# Patient Record
Sex: FEMALE | Race: WHITE | NOT HISPANIC OR LATINO | URBAN - METROPOLITAN AREA
[De-identification: names, ages, dates, MRNs, and addresses within clinical notes are randomized per-mention and may not be internally consistent; named-entity substitution may affect disease eponyms.]

---

## 2021-12-01 ENCOUNTER — OFFICE VISIT (OUTPATIENT)
Dept: URBAN - METROPOLITAN AREA CLINIC 12 | Facility: CLINIC | Age: 76
End: 2021-12-01

## 2021-12-30 ENCOUNTER — OFFICE VISIT (OUTPATIENT)
Dept: URBAN - METROPOLITAN AREA CLINIC 111 | Facility: CLINIC | Age: 76
End: 2021-12-30

## 2022-06-04 ENCOUNTER — CLAIMS CREATED FROM THE CLAIM WINDOW (OUTPATIENT)
Dept: URBAN - METROPOLITAN AREA MEDICAL CENTER 27 | Facility: MEDICAL CENTER | Age: 77
End: 2022-06-04

## 2022-06-04 ENCOUNTER — CLAIMS CREATED FROM THE CLAIM WINDOW (OUTPATIENT)
Dept: URBAN - METROPOLITAN AREA MEDICAL CENTER 27 | Facility: MEDICAL CENTER | Age: 77
End: 2022-06-04
Payer: MEDICARE

## 2022-06-04 DIAGNOSIS — D50.9 ANEMIA: ICD-10-CM

## 2022-06-04 DIAGNOSIS — K31.89 ACQUIRED DEFORMITY OF DUODENUM: ICD-10-CM

## 2022-06-04 DIAGNOSIS — K20.80 ESOPHAGITIS DISSECANS SUPERFICIALIS: ICD-10-CM

## 2022-06-04 DIAGNOSIS — Z79.02 ANTIPLATELET OR ANTITHROMBOTIC LONG-TERM USE: ICD-10-CM

## 2022-06-04 DIAGNOSIS — K92.1 ACUTE MELENA: ICD-10-CM

## 2022-06-04 DIAGNOSIS — D64.89 ANEMIA DUE TO OTHER CAUSE: ICD-10-CM

## 2022-06-04 PROCEDURE — 99223 1ST HOSP IP/OBS HIGH 75: CPT | Performed by: INTERNAL MEDICINE

## 2022-06-04 PROCEDURE — 43239 EGD BIOPSY SINGLE/MULTIPLE: CPT | Performed by: INTERNAL MEDICINE

## 2022-06-04 PROCEDURE — G8427 DOCREV CUR MEDS BY ELIG CLIN: HCPCS | Performed by: INTERNAL MEDICINE

## 2022-06-06 ENCOUNTER — OUT OF OFFICE VISIT (OUTPATIENT)
Dept: URBAN - METROPOLITAN AREA MEDICAL CENTER 27 | Facility: MEDICAL CENTER | Age: 77
End: 2022-06-06

## 2022-06-06 ENCOUNTER — CLAIMS CREATED FROM THE CLAIM WINDOW (OUTPATIENT)
Dept: URBAN - METROPOLITAN AREA MEDICAL CENTER 27 | Facility: MEDICAL CENTER | Age: 77
End: 2022-06-06
Payer: MEDICARE

## 2022-06-06 DIAGNOSIS — K92.1 ACUTE MELENA: ICD-10-CM

## 2022-06-06 DIAGNOSIS — D64.89 ANEMIA DUE TO OTHER CAUSE: ICD-10-CM

## 2022-06-06 DIAGNOSIS — Z79.02 ANTIPLATELET OR ANTITHROMBOTIC LONG-TERM USE: ICD-10-CM

## 2022-06-06 DIAGNOSIS — R10.84 ABDOMINAL CRAMPING, GENERALIZED: ICD-10-CM

## 2022-06-06 PROCEDURE — 99232 SBSQ HOSP IP/OBS MODERATE 35: CPT | Performed by: PHYSICIAN ASSISTANT

## 2022-06-06 PROCEDURE — 99233 SBSQ HOSP IP/OBS HIGH 50: CPT | Performed by: PHYSICIAN ASSISTANT

## 2022-06-20 ENCOUNTER — OFFICE VISIT (OUTPATIENT)
Dept: URBAN - METROPOLITAN AREA CLINIC 23 | Facility: CLINIC | Age: 77
End: 2022-06-20
Payer: MEDICARE

## 2022-06-20 ENCOUNTER — LAB OUTSIDE AN ENCOUNTER (OUTPATIENT)
Dept: URBAN - METROPOLITAN AREA CLINIC 23 | Facility: CLINIC | Age: 77
End: 2022-06-20

## 2022-06-20 ENCOUNTER — WEB ENCOUNTER (OUTPATIENT)
Dept: URBAN - METROPOLITAN AREA CLINIC 23 | Facility: CLINIC | Age: 77
End: 2022-06-20

## 2022-06-20 VITALS
HEART RATE: 53 BPM | DIASTOLIC BLOOD PRESSURE: 63 MMHG | SYSTOLIC BLOOD PRESSURE: 145 MMHG | BODY MASS INDEX: 22.82 KG/M2 | WEIGHT: 142 LBS | TEMPERATURE: 98.1 F | HEIGHT: 66 IN

## 2022-06-20 DIAGNOSIS — R15.9 INCONTINENCE OF FECES, UNSPECIFIED FECAL INCONTINENCE TYPE: ICD-10-CM

## 2022-06-20 DIAGNOSIS — R10.30 LOWER ABDOMINAL PAIN: ICD-10-CM

## 2022-06-20 DIAGNOSIS — D50.0 IRON DEFICIENCY ANEMIA DUE TO CHRONIC BLOOD LOSS: ICD-10-CM

## 2022-06-20 DIAGNOSIS — K21.00 GASTROESOPHAGEAL REFLUX DISEASE WITH ESOPHAGITIS, UNSPECIFIED WHETHER HEMORRHAGE: ICD-10-CM

## 2022-06-20 PROBLEM — 266433003: Status: ACTIVE | Noted: 2022-06-20

## 2022-06-20 PROCEDURE — G8420 CALC BMI NORM PARAMETERS: HCPCS | Performed by: INTERNAL MEDICINE

## 2022-06-20 PROCEDURE — 1036F TOBACCO NON-USER: CPT | Performed by: INTERNAL MEDICINE

## 2022-06-20 PROCEDURE — 99214 OFFICE O/P EST MOD 30 MIN: CPT | Performed by: INTERNAL MEDICINE

## 2022-06-20 PROCEDURE — G8427 DOCREV CUR MEDS BY ELIG CLIN: HCPCS | Performed by: INTERNAL MEDICINE

## 2022-06-20 PROCEDURE — 3017F COLORECTAL CA SCREEN DOC REV: CPT | Performed by: INTERNAL MEDICINE

## 2022-06-20 PROCEDURE — G9622 NO UNHEAL ETOH USER: HCPCS | Performed by: INTERNAL MEDICINE

## 2022-06-20 PROCEDURE — G9903 PT SCRN TBCO ID AS NON USER: HCPCS | Performed by: INTERNAL MEDICINE

## 2022-06-20 RX ORDER — PROPRANOLOL HYDROCHLORIDE 60 MG/1
TAKE 1 CAPSULE (120 MG) BY ORAL ROUTE ONCE DAILY CAPSULE, EXTENDED RELEASE ORAL ONCE A DAY
Refills: 0 | Status: ACTIVE | COMMUNITY
Start: 1900-01-01

## 2022-06-20 RX ORDER — PANTOPRAZOLE SODIUM 40 MG/1
1 TABLET TABLET, DELAYED RELEASE ORAL BID
Qty: 180 TABLET | Refills: 1 | OUTPATIENT
Start: 2022-06-20

## 2022-06-20 RX ORDER — ASPIRIN 81 MG/1
1 TABLET TABLET, COATED ORAL ONCE A DAY
Refills: 0 | Status: ACTIVE | COMMUNITY
Start: 1900-01-01

## 2022-06-20 NOTE — HPI-TODAY'S VISIT:
77-year-old female presents for follow-up of hospital stay for left carotid artery stenosis.  She had a stent placed.  GI was consulted for melena and anemia. EGD showed esophageal ulcer, likely pill induced. She has been taking pantoprazole 40 mg twice daily in place of Prevacid since discharge.  She thinks pantoprazole is working better than Prevacid. She complains of chronic lower abdominal pain, crampy.  Dicyclomine helps. Irregular bowel habit between constipation diarrhea.  Fecal incontinence with loose stools.

## 2022-06-20 NOTE — PREVIOUS WORKUP REVIEWED
REVIEWED EXTERNAL MEDICAL RECORD ENDOSCOPIES-EGD 6/5/2022: A superficial esophageal ulcer without active bleeding at the GE junction. Excessive secretion in the esophagus. Medium sized hiatal hernia. Small amount of food in the gastric body. Mild inflammation in the incisura. Otherwise normal EGD.*Pathology: GE junction-acute inflammation and reactive changes. Negative for intestinal metaplasia.-Upper EUS 9/14/2017: Indication of elevated lipase, abdominal pain. Medium sized hiatal hernia. Gastropathy in the body. Normal ampulla, bile duct, gallbladder and pancreas.*Pathology: Stomach-no H. pylori.-Colonoscopy 4/27/2017: A diminutive polyp in the descending colon. Diverticulosis in the sigmoid colon and the descending colon.-EGD 4/27/2017: Irregular Z-line. Small hiatal hernia. Gastritis in the antrum. Normal duodenum.*Pathology: Duodenum-normal. Gastric body and antrum-mild chronic gastritis, negative for H. pylori. GE junction-reflux esophagitis. Negative for intestinal metaplasia. Descending colon polyp-colonic mucosa.LABS-Labs 6/7/2022: WBC 10, hemoglobin 9.3, platelet 279. BUN 12, creatinine 0.9.-Labs 5/29/2022: WBC 9.9, hemoglobin 13, platelet 270, INR 0.9, sodium 135, potassium 4.8, creatinine 1.3, BUN 21.

## 2022-06-21 LAB
HEMATOCRIT: 35.9
HEMOGLOBIN: 11.9
MCH: 31.3
MCHC: 33.1
MCV: 95
NRBC: (no result)
PLATELETS: 343
RBC: 3.8
RDW: 12.4
WBC: 11.8

## 2022-08-19 ENCOUNTER — TELEPHONE ENCOUNTER (OUTPATIENT)
Dept: URBAN - METROPOLITAN AREA CLINIC 78 | Facility: CLINIC | Age: 77
End: 2022-08-19

## 2022-08-19 ENCOUNTER — LAB OUTSIDE AN ENCOUNTER (OUTPATIENT)
Dept: URBAN - METROPOLITAN AREA CLINIC 78 | Facility: CLINIC | Age: 77
End: 2022-08-19

## 2022-08-19 PROBLEM — 724556004: Status: ACTIVE | Noted: 2022-08-19

## 2022-08-19 PROBLEM — 54586004: Status: ACTIVE | Noted: 2022-06-20

## 2022-08-19 RX ORDER — SODIUM, POTASSIUM,MAG SULFATES 17.5-3.13G
17.5-13.3-1.6 GM/177ML SOLUTION, RECONSTITUTED, ORAL ORAL
Qty: 354 MILLILITER | Refills: 0 | OUTPATIENT
Start: 2022-08-19 | End: 2022-08-20

## 2023-07-20 ENCOUNTER — TELEPHONE ENCOUNTER (OUTPATIENT)
Dept: URBAN - METROPOLITAN AREA CLINIC 92 | Facility: CLINIC | Age: 78
End: 2023-07-20

## 2023-09-07 ENCOUNTER — OFFICE VISIT (OUTPATIENT)
Dept: URBAN - METROPOLITAN AREA CLINIC 78 | Facility: CLINIC | Age: 78
End: 2023-09-07

## 2023-10-04 ENCOUNTER — OFFICE VISIT (OUTPATIENT)
Dept: URBAN - METROPOLITAN AREA CLINIC 111 | Facility: CLINIC | Age: 78
End: 2023-10-04

## 2023-10-18 ENCOUNTER — OFFICE VISIT (OUTPATIENT)
Dept: URBAN - METROPOLITAN AREA CLINIC 111 | Facility: CLINIC | Age: 78
End: 2023-10-18
Payer: MEDICARE

## 2023-10-18 ENCOUNTER — LAB OUTSIDE AN ENCOUNTER (OUTPATIENT)
Dept: URBAN - METROPOLITAN AREA CLINIC 111 | Facility: CLINIC | Age: 78
End: 2023-10-18

## 2023-10-18 VITALS
TEMPERATURE: 97.9 F | HEIGHT: 66 IN | SYSTOLIC BLOOD PRESSURE: 115 MMHG | WEIGHT: 148 LBS | DIASTOLIC BLOOD PRESSURE: 70 MMHG | HEART RATE: 66 BPM | BODY MASS INDEX: 23.78 KG/M2

## 2023-10-18 DIAGNOSIS — K21.9 GERD: ICD-10-CM

## 2023-10-18 DIAGNOSIS — R19.7 DIARRHEA: ICD-10-CM

## 2023-10-18 PROCEDURE — 99214 OFFICE O/P EST MOD 30 MIN: CPT | Performed by: INTERNAL MEDICINE

## 2023-10-18 RX ORDER — ASPIRIN 81 MG/1
1 TABLET TABLET, COATED ORAL ONCE A DAY
Refills: 0 | Status: ACTIVE | COMMUNITY
Start: 1900-01-01

## 2023-10-18 RX ORDER — MECOBALAMIN 5000 MCG
AS DIRECTED LOZENGE ORAL
Status: ACTIVE | COMMUNITY

## 2023-10-18 RX ORDER — PROPRANOLOL HYDROCHLORIDE 60 MG/1
TAKE 1 CAPSULE (120 MG) BY ORAL ROUTE ONCE DAILY CAPSULE, EXTENDED RELEASE ORAL ONCE A DAY
Refills: 0 | Status: ACTIVE | COMMUNITY
Start: 1900-01-01

## 2023-10-18 RX ORDER — FAMOTIDINE 20 MG/1
1 TABLET AT BEDTIME TABLET, FILM COATED ORAL ONCE A DAY
Qty: 30 | Refills: 2 | OUTPATIENT
Start: 2023-10-18

## 2023-10-18 RX ORDER — DICYCLOMINE HYDROCHLORIDE 20 MG/1
1 TABLET TABLET ORAL THREE TIMES A DAY
Status: ACTIVE | COMMUNITY

## 2023-10-18 RX ORDER — PANTOPRAZOLE SODIUM 40 MG/1
1 TABLET TABLET, DELAYED RELEASE ORAL BID
Qty: 180 TABLET | Refills: 1 | Status: ACTIVE | COMMUNITY
Start: 2022-06-20

## 2023-10-18 RX ORDER — HYOSCYAMINE SULFATE 0.12 MG/1
1 TABLET AS NEEDED TABLET ORAL
Qty: 60 | Refills: 3 | OUTPATIENT
Start: 2023-10-18 | End: 2024-02-14

## 2023-10-18 RX ORDER — VITAMIN B COMPLEX
AS DIRECTED CAPSULE ORAL
Status: ACTIVE | COMMUNITY

## 2023-10-18 NOTE — HPI-TODAY'S VISIT:
78 years old female present today with a chief complaint of explosive diarrhea, which has been ongoing for a couple of years. She reports that the diarrhea occurs four to five times a day and sometimes begins after she has been asleep for a while. The patient states that she does not always make it to the bathroom in time. She has a previous diagnosis of irritable bowel syndrome from Dr. Hebert and underwent a colonoscopy in 2017. The patient denies any correlation between her symptoms and her diet.The patient also reports fecal incontinence and severe gas pains, which she describes as being similar to labor pains. She denies any history of surgery and does not consume alcohol. The patient has a family history of colitis, as her father was affected by the condition. Additionally, the patient has a hiatal hernia and experiences severe acid reflux at night, which sometimes wakes her up. She is currently taking Pantoprazole for this issue and has an elevated bed to help alleviate symptoms. The patient mentions that the acid reflux occasionally extends up into her ears. She has previously taken Prevacid, but finds Pantoprazole to be more effective.  she had EGD 6/2022  showed esophageal ulcer and large hiatal hernia  She has been taking pantoprazole 40 mg twice daily in place of Prevacid since discharge.  She thinks pantoprazole is working better than Prevacid. She complains of chronic lower abdominal pain, crampy.  Dicyclomine helps.

## 2023-10-26 ENCOUNTER — LAB OUTSIDE AN ENCOUNTER (OUTPATIENT)
Dept: URBAN - METROPOLITAN AREA CLINIC 23 | Facility: CLINIC | Age: 78
End: 2023-10-26

## 2023-10-30 LAB
ADENOVIRUS F 40/41: NOT DETECTED
CALPROTECTIN, STOOL - QDX: (no result)
CAMPYLOBACTER: NOT DETECTED
CLOSTRIDIUM DIFFICILE: NOT DETECTED
ENTAMOEBA HISTOLYTICA: NOT DETECTED
ENTEROAGGREGATIVE E.COLI: NOT DETECTED
ENTEROTOXIGENIC E.COLI: NOT DETECTED
ESCHERICHIA COLI O157: NOT DETECTED
GIARDIA LAMBLIA: NOT DETECTED
NOROVIRUS GI/GII: NOT DETECTED
PANCREATICELASTASE ELISA, STOOL: (no result)
ROTAVIRUS A: NOT DETECTED
SALMONELLA SPP.: NOT DETECTED
SHIGA-LIKE TOXIN PRODUCING E.COLI: NOT DETECTED
SHIGELLA SPP. / ENTEROINVASIVE E.COLI: NOT DETECTED
VIBRIO PARAHAEMOLYTICUS: NOT DETECTED
VIBRIO SPP.: NOT DETECTED
YERSINIA ENTEROCOLITICA: NOT DETECTED

## 2023-12-04 ENCOUNTER — OFFICE VISIT (OUTPATIENT)
Dept: URBAN - METROPOLITAN AREA CLINIC 111 | Facility: CLINIC | Age: 78
End: 2023-12-04
Payer: MEDICARE

## 2023-12-04 VITALS
HEIGHT: 66 IN | DIASTOLIC BLOOD PRESSURE: 64 MMHG | SYSTOLIC BLOOD PRESSURE: 106 MMHG | TEMPERATURE: 97.7 F | WEIGHT: 148 LBS | BODY MASS INDEX: 23.78 KG/M2 | HEART RATE: 88 BPM

## 2023-12-04 DIAGNOSIS — R19.7 DIARRHEA: ICD-10-CM

## 2023-12-04 DIAGNOSIS — R13.19 ESOPHAGEAL DYSPHAGIA: ICD-10-CM

## 2023-12-04 DIAGNOSIS — K21.9 CHRONIC GERD: ICD-10-CM

## 2023-12-04 PROCEDURE — 99213 OFFICE O/P EST LOW 20 MIN: CPT | Performed by: NURSE PRACTITIONER

## 2023-12-04 RX ORDER — FAMOTIDINE 40 MG/1
1 TABLET AT BEDTIME TABLET, FILM COATED ORAL ONCE A DAY
Qty: 90 TABLET | Refills: 3 | OUTPATIENT
Start: 2023-12-04

## 2023-12-04 RX ORDER — PROPRANOLOL HYDROCHLORIDE 60 MG/1
TAKE 1 CAPSULE (120 MG) BY ORAL ROUTE ONCE DAILY CAPSULE, EXTENDED RELEASE ORAL ONCE A DAY
Refills: 0 | Status: ACTIVE | COMMUNITY
Start: 1900-01-01

## 2023-12-04 RX ORDER — DICYCLOMINE HYDROCHLORIDE 20 MG/1
1 TABLET TABLET ORAL THREE TIMES A DAY
Status: ACTIVE | COMMUNITY

## 2023-12-04 RX ORDER — MECOBALAMIN 5000 MCG
AS DIRECTED LOZENGE ORAL
Status: ACTIVE | COMMUNITY

## 2023-12-04 RX ORDER — HYOSCYAMINE SULFATE 0.12 MG/1
1 TABLET AS NEEDED TABLET ORAL
Qty: 60 | Refills: 3 | Status: ACTIVE | COMMUNITY
Start: 2023-10-18 | End: 2024-02-14

## 2023-12-04 RX ORDER — PANTOPRAZOLE SODIUM 40 MG/1
1 TABLET TABLET, DELAYED RELEASE ORAL BID
Qty: 180 TABLET | Refills: 1 | Status: ACTIVE | COMMUNITY
Start: 2022-06-20

## 2023-12-04 RX ORDER — ASPIRIN 81 MG/1
1 TABLET TABLET, COATED ORAL ONCE A DAY
Refills: 0 | Status: ACTIVE | COMMUNITY
Start: 1900-01-01

## 2023-12-04 RX ORDER — VITAMIN B COMPLEX
AS DIRECTED CAPSULE ORAL
Status: ACTIVE | COMMUNITY

## 2023-12-04 NOTE — HPI-TODAY'S VISIT:
79 yo female with pmh of GERD and IBS presents for dysphagia. She was seen by Dr. Ellis on10/18/23 for chronic diarrhea, fecal incontinence and gas. She has hiatal hernia and experiencing dysphagia and acid reflux at night which wakes her up at times. She is currently taking pantoprazole 40 mg daily with some relief. Last colonoscopy in 2017 diverticulosis and benign polyp. Last EGD 6/2022 showed esophageal ulcer and medium-sized hiatal hernia. Denies cp or sob.

## 2023-12-06 ENCOUNTER — OFFICE VISIT (OUTPATIENT)
Dept: URBAN - METROPOLITAN AREA CLINIC 111 | Facility: CLINIC | Age: 78
End: 2023-12-06

## 2023-12-13 ENCOUNTER — OFFICE VISIT (OUTPATIENT)
Dept: URBAN - METROPOLITAN AREA LAB 3 | Facility: LAB | Age: 78
End: 2023-12-13
Payer: MEDICARE

## 2023-12-13 DIAGNOSIS — R13.19 CERVICAL DYSPHAGIA: ICD-10-CM

## 2023-12-13 DIAGNOSIS — R19.7 ACUTE DIARRHEA: ICD-10-CM

## 2023-12-13 DIAGNOSIS — K22.2 ACQUIRED ESOPHAGEAL RING: ICD-10-CM

## 2023-12-13 PROCEDURE — 45380 COLONOSCOPY AND BIOPSY: CPT | Performed by: INTERNAL MEDICINE

## 2023-12-13 PROCEDURE — 43249 ESOPH EGD DILATION <30 MM: CPT | Performed by: INTERNAL MEDICINE

## 2023-12-13 PROCEDURE — 43239 EGD BIOPSY SINGLE/MULTIPLE: CPT | Performed by: INTERNAL MEDICINE

## 2023-12-13 RX ORDER — DICYCLOMINE HYDROCHLORIDE 20 MG/1
1 TABLET TABLET ORAL THREE TIMES A DAY
Status: ACTIVE | COMMUNITY

## 2023-12-13 RX ORDER — HYOSCYAMINE SULFATE 0.12 MG/1
1 TABLET AS NEEDED TABLET ORAL
Qty: 60 | Refills: 3 | Status: ACTIVE | COMMUNITY
Start: 2023-10-18 | End: 2024-02-14

## 2023-12-13 RX ORDER — MECOBALAMIN 5000 MCG
AS DIRECTED LOZENGE ORAL
Status: ACTIVE | COMMUNITY

## 2023-12-13 RX ORDER — ASPIRIN 81 MG/1
1 TABLET TABLET, COATED ORAL ONCE A DAY
Refills: 0 | Status: ACTIVE | COMMUNITY
Start: 1900-01-01

## 2023-12-13 RX ORDER — FAMOTIDINE 40 MG/1
1 TABLET AT BEDTIME TABLET, FILM COATED ORAL ONCE A DAY
Qty: 90 TABLET | Refills: 3 | Status: ACTIVE | COMMUNITY
Start: 2023-12-04

## 2023-12-13 RX ORDER — PROPRANOLOL HYDROCHLORIDE 60 MG/1
TAKE 1 CAPSULE (120 MG) BY ORAL ROUTE ONCE DAILY CAPSULE, EXTENDED RELEASE ORAL ONCE A DAY
Refills: 0 | Status: ACTIVE | COMMUNITY
Start: 1900-01-01

## 2023-12-13 RX ORDER — VITAMIN B COMPLEX
AS DIRECTED CAPSULE ORAL
Status: ACTIVE | COMMUNITY

## 2023-12-13 RX ORDER — PANTOPRAZOLE SODIUM 40 MG/1
1 TABLET TABLET, DELAYED RELEASE ORAL BID
Qty: 180 TABLET | Refills: 1 | Status: ACTIVE | COMMUNITY
Start: 2022-06-20

## 2024-02-28 ENCOUNTER — OV EP (OUTPATIENT)
Dept: URBAN - METROPOLITAN AREA CLINIC 111 | Facility: CLINIC | Age: 79
End: 2024-02-28
Payer: MEDICARE

## 2024-02-28 VITALS — HEIGHT: 66 IN | BODY MASS INDEX: 24.27 KG/M2 | WEIGHT: 151 LBS | TEMPERATURE: 97.7 F

## 2024-02-28 DIAGNOSIS — K44.9 HIATAL HERNIA: ICD-10-CM

## 2024-02-28 DIAGNOSIS — R19.7 DIARRHEA: ICD-10-CM

## 2024-02-28 DIAGNOSIS — D50.0 IRON DEFICIENCY ANEMIA DUE TO CHRONIC BLOOD LOSS: ICD-10-CM

## 2024-02-28 DIAGNOSIS — K22.2 ESOPHAGEAL STRICTURE: ICD-10-CM

## 2024-02-28 PROCEDURE — 99213 OFFICE O/P EST LOW 20 MIN: CPT | Performed by: INTERNAL MEDICINE

## 2024-02-28 RX ORDER — DICYCLOMINE HYDROCHLORIDE 20 MG/1
1 TABLET TABLET ORAL THREE TIMES A DAY
Status: ACTIVE | COMMUNITY

## 2024-02-28 RX ORDER — ASPIRIN 81 MG/1
1 TABLET TABLET, COATED ORAL ONCE A DAY
Refills: 0 | Status: ACTIVE | COMMUNITY
Start: 1900-01-01

## 2024-02-28 RX ORDER — MECOBALAMIN 5000 MCG
AS DIRECTED LOZENGE ORAL
Status: ACTIVE | COMMUNITY

## 2024-02-28 RX ORDER — PROPRANOLOL HYDROCHLORIDE 60 MG/1
TAKE 1 CAPSULE (120 MG) BY ORAL ROUTE ONCE DAILY CAPSULE, EXTENDED RELEASE ORAL ONCE A DAY
Refills: 0 | Status: ACTIVE | COMMUNITY
Start: 1900-01-01

## 2024-02-28 RX ORDER — PANTOPRAZOLE SODIUM 40 MG/1
1 TABLET TABLET, DELAYED RELEASE ORAL BID
Qty: 180 TABLET | Refills: 1 | Status: ACTIVE | COMMUNITY
Start: 2022-06-20

## 2024-02-28 RX ORDER — VITAMIN B COMPLEX
AS DIRECTED CAPSULE ORAL
Status: ACTIVE | COMMUNITY

## 2024-02-28 RX ORDER — FAMOTIDINE 40 MG/1
1 TABLET AT BEDTIME TABLET, FILM COATED ORAL ONCE A DAY
Qty: 90 TABLET | Refills: 3 | Status: ACTIVE | COMMUNITY
Start: 2023-12-04

## 2024-02-28 NOTE — HPI-TODAY'S VISIT:
80 yo female presents today for follow-up after upper endoscopy and colonoscopy performed December 13, 2023 for dysphagia and diarrhea.  Her upper endoscopy revealed large hiatal hernia with distal esophageal stricture which was dilated with balloon to 20 mm her colonoscopy showed severe diverticulosis random biopsy no microscopic colitis.  She is here for follow-up she reports she feels much better her dysphagia improved since the dilation she is on pantoprazole 40 mg a day in the morning and Pepcid 20 mg at bedtime she feels better.  Her diarrhea resolved.

## 2024-03-20 ENCOUNTER — OV EP (OUTPATIENT)
Dept: URBAN - METROPOLITAN AREA CLINIC 111 | Facility: CLINIC | Age: 79
End: 2024-03-20

## 2024-05-08 ENCOUNTER — TELEPHONE ENCOUNTER (OUTPATIENT)
Dept: URBAN - METROPOLITAN AREA CLINIC 111 | Facility: CLINIC | Age: 79
End: 2024-05-08

## 2024-05-08 RX ORDER — PANTOPRAZOLE SODIUM 40 MG/1
1 TABLET TABLET, DELAYED RELEASE ORAL ONCE A DAY
Qty: 90 TABLET | Refills: 1
Start: 2022-06-20

## 2024-11-25 ENCOUNTER — TELEPHONE ENCOUNTER (OUTPATIENT)
Dept: URBAN - METROPOLITAN AREA CLINIC 111 | Facility: CLINIC | Age: 79
End: 2024-11-25

## 2024-11-25 RX ORDER — PANTOPRAZOLE SODIUM 40 MG/1
1 TABLET TABLET, DELAYED RELEASE ORAL ONCE A DAY
Qty: 90 TABLET | Refills: 1
Start: 2022-06-20

## 2025-01-26 ENCOUNTER — ERX REFILL RESPONSE (OUTPATIENT)
Dept: URBAN - METROPOLITAN AREA CLINIC 23 | Facility: CLINIC | Age: 80
End: 2025-01-26

## 2025-01-26 RX ORDER — FAMOTIDINE 20 MG/1
1 TABLET AT BEDTIME AS NEEDED TABLET, FILM COATED ORAL ONCE A DAY
Qty: 90 TABLET | Refills: 3 | OUTPATIENT

## 2025-06-04 ENCOUNTER — TELEPHONE ENCOUNTER (OUTPATIENT)
Dept: URBAN - METROPOLITAN AREA CLINIC 111 | Facility: CLINIC | Age: 80
End: 2025-06-04

## 2025-06-04 RX ORDER — PANTOPRAZOLE SODIUM 40 MG/1
1 TABLET TABLET, DELAYED RELEASE ORAL ONCE A DAY
Qty: 30 | Refills: 0
Start: 2022-06-20

## 2025-06-16 ENCOUNTER — DASHBOARD ENCOUNTERS (OUTPATIENT)
Age: 80
End: 2025-06-16

## 2025-06-16 ENCOUNTER — OFFICE VISIT (OUTPATIENT)
Dept: URBAN - METROPOLITAN AREA CLINIC 111 | Facility: CLINIC | Age: 80
End: 2025-06-16
Payer: MEDICARE

## 2025-06-16 DIAGNOSIS — K21.9 CHRONIC GERD: ICD-10-CM

## 2025-06-16 DIAGNOSIS — R15.9 FECAL INCONTINENCE: ICD-10-CM

## 2025-06-16 DIAGNOSIS — Z87.19 HISTORY OF ESOPHAGEAL STRICTURE: ICD-10-CM

## 2025-06-16 DIAGNOSIS — K58.1 IBS: ICD-10-CM

## 2025-06-16 DIAGNOSIS — R14.0 BLOATING: ICD-10-CM

## 2025-06-16 DIAGNOSIS — K30 INDIGESTION: ICD-10-CM

## 2025-06-16 DIAGNOSIS — K44.9 HIATAL HERNIA: ICD-10-CM

## 2025-06-16 PROBLEM — 235595009: Status: ACTIVE | Noted: 2025-06-16

## 2025-06-16 PROCEDURE — 99214 OFFICE O/P EST MOD 30 MIN: CPT | Performed by: PHYSICIAN ASSISTANT

## 2025-06-16 RX ORDER — MECOBALAMIN 5000 MCG
AS DIRECTED LOZENGE ORAL
Status: ACTIVE | COMMUNITY

## 2025-06-16 RX ORDER — PROPRANOLOL HYDROCHLORIDE 60 MG/1
TAKE 1 CAPSULE (120 MG) BY ORAL ROUTE ONCE DAILY CAPSULE, EXTENDED RELEASE ORAL ONCE A DAY
Refills: 0 | Status: ACTIVE | COMMUNITY
Start: 1900-01-01

## 2025-06-16 RX ORDER — PANTOPRAZOLE SODIUM 40 MG/1
1 TABLET TABLET, DELAYED RELEASE ORAL ONCE A DAY
Qty: 90 | Refills: 3
Start: 2022-06-20

## 2025-06-16 RX ORDER — PANTOPRAZOLE SODIUM 40 MG/1
1 TABLET TABLET, DELAYED RELEASE ORAL ONCE A DAY
Qty: 30 | Refills: 0 | Status: ACTIVE | COMMUNITY
Start: 2022-06-20

## 2025-06-16 RX ORDER — ASPIRIN 81 MG/1
1 TABLET TABLET, COATED ORAL ONCE A DAY
Refills: 0 | Status: ACTIVE | COMMUNITY
Start: 1900-01-01

## 2025-06-16 RX ORDER — FAMOTIDINE 40 MG/1
1 TABLET AT BEDTIME TABLET, FILM COATED ORAL ONCE A DAY
Qty: 90 TABLET | Refills: 3 | Status: ACTIVE | COMMUNITY
Start: 2023-12-04

## 2025-06-16 RX ORDER — DICYCLOMINE HYDROCHLORIDE 20 MG/1
1 TABLET TABLET ORAL THREE TIMES A DAY
Qty: 270 TABLET | Refills: 1 | OUTPATIENT
Start: 2025-06-16

## 2025-06-16 RX ORDER — VITAMIN B COMPLEX
AS DIRECTED CAPSULE ORAL
Status: ACTIVE | COMMUNITY

## 2025-06-16 RX ORDER — FAMOTIDINE 20 MG/1
1 TABLET AT BEDTIME AS NEEDED TABLET, FILM COATED ORAL ONCE A DAY
Qty: 90 TABLET | Refills: 3 | Status: ACTIVE | COMMUNITY

## 2025-06-16 RX ORDER — DICYCLOMINE HYDROCHLORIDE 20 MG/1
1 TABLET TABLET ORAL THREE TIMES A DAY
Status: ACTIVE | COMMUNITY

## 2025-06-16 RX ORDER — FAMOTIDINE 40 MG/1
1 TABLET AT BEDTIME TABLET, FILM COATED ORAL ONCE A DAY
Qty: 90 TABLET | Refills: 3
Start: 2023-12-04

## 2025-06-16 NOTE — HPI-TODAY'S VISIT:
81 y/o female here for PPI refill. Last seen in 2/2024 by Dr. Ellis for esophageal stricture, diarrhea, iron deficiency anemia, and hiatal hernia. Pt has been taking Pantoprazole 40 mg qd and Pepcid prn at night. D/t risk of surgery she will continue PPI. Potential long-term risks of PPI use were discussed. She had EGD and colon in 12/2023. Her upper endoscopy revealed large hiatal hernia and distal esophageal stricture which was dilated with balloon to 20 mm. Her colonoscopy showed severe diverticulosis and random biopsy with no microscopic colitis. Stool studies in 10/2023 unimpressive.  She is waking up 2-3 x at night with reflux symptoms. She is taking Pantoprazole 40 mg qd and Famotidine 20 mg nightly. She is taking Dicyclomine as needed for cramping. She has diarrhea sometimes. It can be 2-3 times a month. She has fecal incontinence a few times a week. No rectal bleeding. She occasionally has dysphagia. It is mainly with chicken and rice which she limits eating. Dysphagia improved significantly after her dilation. She has been taking Creatine and collagen supplements. She has bloating and indigestion sometimes.